# Patient Record
Sex: FEMALE | Race: BLACK OR AFRICAN AMERICAN | NOT HISPANIC OR LATINO | URBAN - METROPOLITAN AREA
[De-identification: names, ages, dates, MRNs, and addresses within clinical notes are randomized per-mention and may not be internally consistent; named-entity substitution may affect disease eponyms.]

---

## 2023-05-25 ENCOUNTER — OFFICE VISIT (OUTPATIENT)
Dept: URBAN - METROPOLITAN AREA CLINIC 98 | Facility: CLINIC | Age: 28
End: 2023-05-25
Payer: COMMERCIAL

## 2023-05-25 ENCOUNTER — LAB OUTSIDE AN ENCOUNTER (OUTPATIENT)
Dept: URBAN - METROPOLITAN AREA CLINIC 98 | Facility: CLINIC | Age: 28
End: 2023-05-25

## 2023-05-25 VITALS
BODY MASS INDEX: 19.09 KG/M2 | HEIGHT: 66 IN | HEART RATE: 102 BPM | TEMPERATURE: 97.2 F | DIASTOLIC BLOOD PRESSURE: 81 MMHG | WEIGHT: 118.8 LBS | SYSTOLIC BLOOD PRESSURE: 146 MMHG

## 2023-05-25 DIAGNOSIS — K52.9 CHRONIC DIARRHEA: ICD-10-CM

## 2023-05-25 DIAGNOSIS — K92.1 HEMATOCHEZIA: ICD-10-CM

## 2023-05-25 DIAGNOSIS — R10.84 GENERALIZED ABDOMINAL PAIN: ICD-10-CM

## 2023-05-25 DIAGNOSIS — R63.4 UNINTENTIONAL WEIGHT LOSS: ICD-10-CM

## 2023-05-25 PROCEDURE — 99204 OFFICE O/P NEW MOD 45 MIN: CPT | Performed by: INTERNAL MEDICINE

## 2023-05-25 RX ORDER — SODIUM, POTASSIUM,MAG SULFATES 17.5-3.13G
177ML SOLUTION, RECONSTITUTED, ORAL ORAL ONCE
Qty: 177 ML | Refills: 0 | OUTPATIENT
Start: 2023-05-25 | End: 2023-05-26

## 2023-05-25 NOTE — HPI-TODAY'S VISIT:
Ms. Hong is a 27 yo F presenting for new patient visit abdominal pain.  One year she had a salmon pasta and had to have diarrhea within 1.5 hours after eating it.  Ever since then she noticed that her stools were looser. She has 5 BMs per day, can be loose.  She sometimes sees red blood mixed into the stools. In the past 2 months she has seen blood in her stools every day.  She feels fatigued and weak.  No nausea or vomiting.  She feels a sharp pain before having a BM that is improved after having a BM.   She has lost about 10 lbs in the last year unintentional weight loss.  No NSAID use.

## 2023-05-25 NOTE — EXAM-FUNCTIONAL ASSESSMENT
Constitutional: well-developed, normal communication ability.   Eyes: Conjunctivae and eyelids appear normal, no scleral icterus. Respiratory:  symmetric expansion of chest wall, normal work of breathing   Gastrointestinal:  normoactive bowel sounds, soft, no tenderness, no rebound tenderness, no shifting dullness, no organomegaly, Rectal exam- no hemorrhoids, no blood in the stool, normal maneuvers. Chaperone deferred by patient   Musculoskeletal: normal gait and station, no tenderness present.   Skin: No jaundice   Neurologic: Oriented to person, place, time. Short term memory intact.    Psychiatric: Normal mood and appropriate affect.

## 2023-05-27 LAB
A/G RATIO: 1.3
ALBUMIN: 4.5
ALKALINE PHOSPHATASE: 62
ALT (SGPT): 10
AST (SGOT): 21
BASO (ABSOLUTE): 0
BASOS: 0
BILIRUBIN, TOTAL: 0.2
BUN/CREATININE RATIO: 13
BUN: 9
CALCIUM: 9.6
CARBON DIOXIDE, TOTAL: 21
CHLORIDE: 103
CREATININE: 0.68
EGFR: 122
ENDOMYSIAL ANTIBODY IGA: NEGATIVE
EOS (ABSOLUTE): 0.1
EOS: 1
GLOBULIN, TOTAL: 3.5
GLUCOSE: 86
HEMATOCRIT: 23.4
HEMATOLOGY COMMENTS:: (no result)
HEMOGLOBIN: 5.7
IMMATURE CELLS: (no result)
IMMATURE GRANS (ABS): 0
IMMATURE GRANULOCYTES: 0
IMMUNOGLOBULIN A, QN, SERUM: 300
LYMPHS (ABSOLUTE): 1.8
LYMPHS: 12
MCH: 15.4
MCHC: 24.4
MCV: 63
MONOCYTES(ABSOLUTE): 0.8
MONOCYTES: 5
NEUTROPHILS (ABSOLUTE): 11.7
NEUTROPHILS: 82
NRBC: (no result)
PLATELETS: 843
POTASSIUM: 4
PROTEIN, TOTAL: 8
RBC: 3.7
RDW: 25.3
SODIUM: 139
T-TRANSGLUTAMINASE (TTG) IGA: <2
WBC: 14.4

## 2023-05-29 ENCOUNTER — TELEPHONE ENCOUNTER (OUTPATIENT)
Dept: URBAN - METROPOLITAN AREA CLINIC 98 | Facility: CLINIC | Age: 28
End: 2023-05-29

## 2023-06-26 ENCOUNTER — OFFICE VISIT (OUTPATIENT)
Dept: URBAN - METROPOLITAN AREA SURGERY CENTER 18 | Facility: SURGERY CENTER | Age: 28
End: 2023-06-26
Payer: COMMERCIAL

## 2023-06-26 ENCOUNTER — TELEPHONE ENCOUNTER (OUTPATIENT)
Dept: URBAN - METROPOLITAN AREA CLINIC 98 | Facility: CLINIC | Age: 28
End: 2023-06-26

## 2023-06-26 ENCOUNTER — CLAIMS CREATED FROM THE CLAIM WINDOW (OUTPATIENT)
Dept: URBAN - METROPOLITAN AREA CLINIC 4 | Facility: CLINIC | Age: 28
End: 2023-06-26
Payer: COMMERCIAL

## 2023-06-26 DIAGNOSIS — K51.80 CHRONIC PANCOLONIC ULCERATIVE COLITIS: ICD-10-CM

## 2023-06-26 DIAGNOSIS — K51.919 ULCERATIVE COLITIS, UNSPECIFIED WITH UNSPECIFIED COMPLICATIONS: ICD-10-CM

## 2023-06-26 DIAGNOSIS — K31.89 OTHER DISEASES OF STOMACH AND DUODENUM: ICD-10-CM

## 2023-06-26 PROCEDURE — G8907 PT DOC NO EVENTS ON DISCHARG: HCPCS | Performed by: INTERNAL MEDICINE

## 2023-06-26 PROCEDURE — 88305 TISSUE EXAM BY PATHOLOGIST: CPT | Performed by: PATHOLOGY

## 2023-06-26 PROCEDURE — 45380 COLONOSCOPY AND BIOPSY: CPT | Performed by: INTERNAL MEDICINE

## 2023-06-26 PROCEDURE — 88342 IMHCHEM/IMCYTCHM 1ST ANTB: CPT | Performed by: PATHOLOGY

## 2023-07-01 ENCOUNTER — CLAIMS CREATED FROM THE CLAIM WINDOW (OUTPATIENT)
Dept: URBAN - METROPOLITAN AREA MEDICAL CENTER 26 | Facility: MEDICAL CENTER | Age: 28
End: 2023-07-01

## 2023-07-01 ENCOUNTER — CLAIMS CREATED FROM THE CLAIM WINDOW (OUTPATIENT)
Dept: URBAN - METROPOLITAN AREA MEDICAL CENTER 26 | Facility: MEDICAL CENTER | Age: 28
End: 2023-07-01
Payer: COMMERCIAL

## 2023-07-01 DIAGNOSIS — D50.9 ANEMIA: ICD-10-CM

## 2023-07-01 DIAGNOSIS — K51.00 ACUTE ULCERATIVE PANCOLITIS: ICD-10-CM

## 2023-07-01 DIAGNOSIS — D72.828 HIGH BLOOD MONOCYTE COUNT: ICD-10-CM

## 2023-07-01 PROCEDURE — 99222 1ST HOSP IP/OBS MODERATE 55: CPT | Performed by: INTERNAL MEDICINE

## 2023-07-01 PROCEDURE — G8427 DOCREV CUR MEDS BY ELIG CLIN: HCPCS | Performed by: INTERNAL MEDICINE

## 2023-07-03 ENCOUNTER — CLAIMS CREATED FROM THE CLAIM WINDOW (OUTPATIENT)
Dept: URBAN - METROPOLITAN AREA MEDICAL CENTER 26 | Facility: MEDICAL CENTER | Age: 28
End: 2023-07-03

## 2023-07-03 ENCOUNTER — CLAIMS CREATED FROM THE CLAIM WINDOW (OUTPATIENT)
Dept: URBAN - METROPOLITAN AREA MEDICAL CENTER 26 | Facility: MEDICAL CENTER | Age: 28
End: 2023-07-03
Payer: COMMERCIAL

## 2023-07-03 DIAGNOSIS — K51.00 ACUTE ULCERATIVE PANCOLITIS: ICD-10-CM

## 2023-07-03 PROCEDURE — 99232 SBSQ HOSP IP/OBS MODERATE 35: CPT | Performed by: INTERNAL MEDICINE

## 2023-07-26 ENCOUNTER — OFFICE VISIT (OUTPATIENT)
Dept: URBAN - METROPOLITAN AREA CLINIC 98 | Facility: CLINIC | Age: 28
End: 2023-07-26
Payer: COMMERCIAL

## 2023-07-26 VITALS
WEIGHT: 121.6 LBS | HEART RATE: 102 BPM | HEIGHT: 66 IN | SYSTOLIC BLOOD PRESSURE: 139 MMHG | TEMPERATURE: 97.4 F | BODY MASS INDEX: 19.54 KG/M2 | DIASTOLIC BLOOD PRESSURE: 88 MMHG

## 2023-07-26 DIAGNOSIS — K51.011 ULCERATIVE PANCOLITIS WITH RECTAL BLEEDING: ICD-10-CM

## 2023-07-26 PROBLEM — 444548001: Status: ACTIVE | Noted: 2023-07-26

## 2023-07-26 PROCEDURE — 99214 OFFICE O/P EST MOD 30 MIN: CPT | Performed by: INTERNAL MEDICINE

## 2023-07-26 RX ORDER — LACTOBACIL 2/BIFIDO 1/S.THERMO 900B CELL
AS DIRECTED PACKET (EA) ORAL TWICE DAILY
Qty: 60 | Refills: 3 | OUTPATIENT
Start: 2023-07-26 | End: 2023-11-22

## 2023-07-26 RX ORDER — MESALAMINE 1.2 G/1
2 TABLETS WITH A MEAL TABLET, DELAYED RELEASE ORAL TWICE A DAY
Qty: 120 TABLET | Refills: 3 | OUTPATIENT
Start: 2023-07-26 | End: 2023-11-22

## 2023-07-26 NOTE — HPI-TODAY'S VISIT:
Ms. Hong is a 29 yo F presenting for followup patient visit for ulcerative colitis.  Went to [facility] for worsening bloody diarrhea and pain.  Put on IV steroids, now on a taper.  No longer having abdominal pain Having 3 solid soft BMs per day, no longer bleeding.  Weight is increasing.  No rashes or eye pain.  I reviewed:  6/26/23 colonoscopy: diffuse inflammation throughout colon 6/26/23 colon path: ulcerative colitis

## 2023-08-01 ENCOUNTER — TELEPHONE ENCOUNTER (OUTPATIENT)
Dept: URBAN - METROPOLITAN AREA CLINIC 98 | Facility: CLINIC | Age: 28
End: 2023-08-01

## 2023-08-01 LAB
HBSAG SCREEN: NEGATIVE
HEP B CORE AB, TOT: NEGATIVE
HEP B SURFACE AB, QUAL: NON REACTIVE
INTERPRETATION: (no result)
QUANTIFERON CRITERIA: (no result)
QUANTIFERON INCUBATION: (no result)
QUANTIFERON MITOGEN VALUE: >10
QUANTIFERON NIL VALUE: 0.1
QUANTIFERON TB1 AG VALUE: 0.08
QUANTIFERON TB2 AG VALUE: 0.58
QUANTIFERON-TB GOLD PLUS: POSITIVE
RFX TO HBC IGM: (no result)

## 2023-08-28 ENCOUNTER — WEB ENCOUNTER (OUTPATIENT)
Dept: URBAN - METROPOLITAN AREA CLINIC 98 | Facility: CLINIC | Age: 28
End: 2023-08-28

## 2023-08-28 RX ORDER — MESALAMINE 1.2 G/1
2 TABLETS WITH A MEAL TABLET, DELAYED RELEASE ORAL TWICE A DAY
Qty: 120 TABLET | Refills: 3
Start: 2023-07-26 | End: 2023-12-28

## 2023-09-30 ENCOUNTER — WEB ENCOUNTER (OUTPATIENT)
Dept: URBAN - METROPOLITAN AREA CLINIC 98 | Facility: CLINIC | Age: 28
End: 2023-09-30

## 2023-09-30 RX ORDER — PANTOPRAZOLE SODIUM 40 MG/1
1 TABLET TABLET, DELAYED RELEASE ORAL ONCE A DAY
Qty: 30 | Refills: 1

## 2023-09-30 RX ORDER — MESALAMINE 1.2 G/1
2 TABLETS WITH A MEAL TABLET, DELAYED RELEASE ORAL TWICE A DAY
Qty: 120 TABLET | Refills: 3
Start: 2023-07-26 | End: 2024-01-30

## 2023-10-05 ENCOUNTER — TELEPHONE ENCOUNTER (OUTPATIENT)
Dept: URBAN - METROPOLITAN AREA CLINIC 6 | Facility: CLINIC | Age: 28
End: 2023-10-05

## 2023-10-16 ENCOUNTER — OFFICE VISIT (OUTPATIENT)
Dept: URBAN - METROPOLITAN AREA TELEHEALTH 2 | Facility: TELEHEALTH | Age: 28
End: 2023-10-16
Payer: COMMERCIAL

## 2023-10-16 VITALS — WEIGHT: 123 LBS | BODY MASS INDEX: 19.77 KG/M2 | HEIGHT: 66 IN

## 2023-10-16 DIAGNOSIS — K51.00 ULCERATIVE PANCOLITIS WITHOUT COMPLICATION: ICD-10-CM

## 2023-10-16 DIAGNOSIS — K21.9 GERD WITHOUT ESOPHAGITIS: ICD-10-CM

## 2023-10-16 PROBLEM — 444548001: Status: ACTIVE | Noted: 2023-10-16

## 2023-10-16 PROBLEM — 266435005: Status: ACTIVE | Noted: 2023-10-16

## 2023-10-16 PROCEDURE — 99214 OFFICE O/P EST MOD 30 MIN: CPT | Performed by: INTERNAL MEDICINE

## 2023-10-16 RX ORDER — LACTOBACIL 2/BIFIDO 1/S.THERMO 900B CELL
AS DIRECTED PACKET (EA) ORAL TWICE DAILY
Qty: 60 | Refills: 3 | Status: ACTIVE | COMMUNITY
Start: 2023-07-26 | End: 2023-11-22

## 2023-10-16 RX ORDER — MESALAMINE 1.2 G/1
2 TABLETS WITH A MEAL TABLET, DELAYED RELEASE ORAL TWICE A DAY
Qty: 120 TABLET | Refills: 3 | OUTPATIENT

## 2023-10-16 RX ORDER — LACTOBACIL 2/BIFIDO 1/S.THERMO 900B CELL
AS DIRECTED PACKET (EA) ORAL TWICE DAILY
Qty: 60 | Refills: 3 | OUTPATIENT

## 2023-10-16 RX ORDER — PANTOPRAZOLE SODIUM 40 MG/1
1 TABLET TABLET, DELAYED RELEASE ORAL ONCE A DAY
Qty: 30 | Refills: 1 | Status: ACTIVE | COMMUNITY

## 2023-10-16 RX ORDER — MESALAMINE 1.2 G/1
2 TABLETS WITH A MEAL TABLET, DELAYED RELEASE ORAL TWICE A DAY
Qty: 120 TABLET | Refills: 3 | Status: ACTIVE | COMMUNITY
Start: 2023-07-26 | End: 2024-01-30

## 2023-10-16 NOTE — HPI-TODAY'S VISIT:
Ms. Hong is a 27 yo F presenting for followup patient visit for ulcerative colitis. Last visit with me 23.  Started on mesalamine at last visit. Taking this daily. Feels it is working well for her, feels she is much better.  No side effects from the meds. Still taking pantoprazole 40 daily but has no heartburn, reflux, or epigastric pain or burning.  IBD Checklist  Mucosal sores/ulcers:0 Rashes:0 Change in vision/eye pain:0 Weight loss: 0 Abdominal pain in the last 7 days: 0 except for mild cramping with some BMs Number of stools per day: 1-3 Consistency of stools per day: solid to mushy Fever in the last 7 days:0 Blood in stool in the last 7 days: 0   I reviewed:  23 colonoscopy: diffuse inflammation throughout colon 23 colon path: ulcerative colitis  23 Quant gold: positive  Patient seen today via telehealth by agreement and consent of patient in light of current COVID-19 pandemic. I used video conferencing during the visit. The patient encounter is appropriate and reasonable under the circumstances given the patient's particular presentation at this time. The patient has been advised of the followin) the potential risks and limitations of this mode of treatment (including but not limited to the absence of in-person examination); 2) the right to refuse telehealth services at any point without affecting the right to future care; 3) the right to receive in-person services, included immediately after this consultation if an urgent need arises; 4) information, including identifiable images or information from this telehealth consult, will only be shared in accordance with HIPPA regulations. Any and all of the patient's and/or patient's family member's questions on this issue have been answered. The patient has verbally consented to be treated via telehealth services. The patient has also been advised to contact this office for worsening conditions or problems, and seek emergency medical treatment and/or call 911 if the patient deems either necessary.   More than half of the face-to-face time used for counseling and coordination of care.  The patient received telehealth services at: home

## 2024-01-08 ENCOUNTER — OFFICE VISIT (OUTPATIENT)
Dept: URBAN - METROPOLITAN AREA CLINIC 98 | Facility: CLINIC | Age: 29
End: 2024-01-08

## 2024-01-22 ENCOUNTER — OFFICE VISIT (OUTPATIENT)
Dept: URBAN - METROPOLITAN AREA TELEHEALTH 2 | Facility: TELEHEALTH | Age: 29
End: 2024-01-22

## 2024-01-22 RX ORDER — LACTOBACIL 2/BIFIDO 1/S.THERMO 900B CELL
AS DIRECTED PACKET (EA) ORAL TWICE DAILY
Qty: 60 | Refills: 3 | OUTPATIENT

## 2024-01-22 RX ORDER — LACTOBACIL 2/BIFIDO 1/S.THERMO 900B CELL
AS DIRECTED PACKET (EA) ORAL TWICE DAILY
Qty: 60 | Refills: 3 | Status: ACTIVE | COMMUNITY

## 2024-01-22 RX ORDER — MESALAMINE 1.2 G/1
2 TABLETS WITH A MEAL TABLET, DELAYED RELEASE ORAL TWICE A DAY
Qty: 120 TABLET | Refills: 3 | OUTPATIENT

## 2024-01-22 RX ORDER — PANTOPRAZOLE SODIUM 40 MG/1
1 TABLET TABLET, DELAYED RELEASE ORAL ONCE A DAY
Qty: 30 | Refills: 1 | Status: ACTIVE | COMMUNITY

## 2024-01-22 RX ORDER — MESALAMINE 1.2 G/1
2 TABLETS WITH A MEAL TABLET, DELAYED RELEASE ORAL TWICE A DAY
Qty: 120 TABLET | Refills: 3 | Status: ACTIVE | COMMUNITY

## 2024-01-22 NOTE — HPI-TODAY'S VISIT:
Ms. Hong is a 27 yo F presenting for followup patient visit for ulcerative colitis. Last visit with me 10/16/23.  Started on mesalamine at last visit. Taking this daily. Feels it is working well for her, feels she is much better.  No side effects from the meds. Still taking pantoprazole 40 daily but has no heartburn, reflux, or epigastric pain or burning.  IBD Checklist  Mucosal sores/ulcers:0 Rashes:0 Change in vision/eye pain:0 Weight loss: 0 Abdominal pain in the last 7 days: 0 except for mild cramping with some BMs Number of stools per day: 1-3 Consistency of stools per day: solid to mushy Fever in the last 7 days:0 Blood in stool in the last 7 days: 0   I reviewed:  23 colonoscopy: diffuse inflammation throughout colon 23 colon path: ulcerative colitis  23 Quant gold: positive  Patient seen today via telehealth by agreement and consent of patient in light of current COVID-19 pandemic. I used video conferencing during the visit. The patient encounter is appropriate and reasonable under the circumstances given the patient's particular presentation at this time. The patient has been advised of the followin) the potential risks and limitations of this mode of treatment (including but not limited to the absence of in-person examination); 2) the right to refuse telehealth services at any point without affecting the right to future care; 3) the right to receive in-person services, included immediately after this consultation if an urgent need arises; 4) information, including identifiable images or information from this telehealth consult, will only be shared in accordance with HIPPA regulations. Any and all of the patient's and/or patient's family member's questions on this issue have been answered. The patient has verbally consented to be treated via telehealth services. The patient has also been advised to contact this office for worsening conditions or problems, and seek emergency medical treatment and/or call 911 if the patient deems either necessary.   More than half of the face-to-face time used for counseling and coordination of care.  The patient received telehealth services at: home

## 2024-02-12 ENCOUNTER — OV EP (OUTPATIENT)
Dept: URBAN - METROPOLITAN AREA CLINIC 98 | Facility: CLINIC | Age: 29
End: 2024-02-12

## 2024-02-19 ENCOUNTER — TELEP (OUTPATIENT)
Dept: URBAN - METROPOLITAN AREA TELEHEALTH 2 | Facility: TELEHEALTH | Age: 29
End: 2024-02-19

## 2024-02-19 RX ORDER — LACTOBACIL 2/BIFIDO 1/S.THERMO 900B CELL
AS DIRECTED PACKET (EA) ORAL TWICE DAILY
Qty: 60 | Refills: 3 | COMMUNITY

## 2024-02-19 RX ORDER — MESALAMINE 1.2 G/1
TAKE TWO TABLETS BY MOUTH TWICE A DAY WITH A MEAL TABLET, DELAYED RELEASE ORAL
Qty: 120 TABLET | Refills: 3 | COMMUNITY

## 2024-02-19 RX ORDER — PANTOPRAZOLE SODIUM 40 MG/1
1 TABLET TABLET, DELAYED RELEASE ORAL ONCE A DAY
Qty: 30 | Refills: 1 | COMMUNITY

## 2024-02-19 NOTE — HPI-TODAY'S VISIT:
Ms. Hong is a 29 yo F presenting for followup patient visit for ulcerative colitis. Last visit with me 10/16/23.  Started on mesalamine at last visit. Taking this daily. Feels it is working well for her, feels she is much better.  No side effects from the meds. Still taking pantoprazole 40 daily but has no heartburn, reflux, or epigastric pain or burning.  IBD Checklist  Mucosal sores/ulcers:0 Rashes:0 Change in vision/eye pain:0 Weight loss: 0 Abdominal pain in the last 7 days: 0 except for mild cramping with some BMs Number of stools per day: 1-3 Consistency of stools per day: solid to mushy Fever in the last 7 days:0 Blood in stool in the last 7 days: 0   I reviewed:  24: Crp<1 24 CMP: normal 24 CBC: hgb 8.3, WBC count 13.6 23 colonoscopy: diffuse inflammation throughout colon 23 colon path: ulcerative colitis  23 Quant gold: positive  Patient seen today via telehealth by agreement and consent of patient in light of current COVID-19 pandemic. I used video conferencing during the visit. The patient encounter is appropriate and reasonable under the circumstances given the patient's particular presentation at this time. The patient has been advised of the followin) the potential risks and limitations of this mode of treatment (including but not limited to the absence of in-person examination); 2) the right to refuse telehealth services at any point without affecting the right to future care; 3) the right to receive in-person services, included immediately after this consultation if an urgent need arises; 4) information, including identifiable images or information from this telehealth consult, will only be shared in accordance with HIPPA regulations. Any and all of the patient's and/or patient's family member's questions on this issue have been answered. The patient has verbally consented to be treated via telehealth services. The patient has also been advised to contact this office for worsening conditions or problems, and seek emergency medical treatment and/or call 911 if the patient deems either necessary.   More than half of the face-to-face time used for counseling and coordination of care.  The patient received telehealth services at: home